# Patient Record
Sex: FEMALE | Employment: OTHER | URBAN - METROPOLITAN AREA
[De-identification: names, ages, dates, MRNs, and addresses within clinical notes are randomized per-mention and may not be internally consistent; named-entity substitution may affect disease eponyms.]

---

## 2022-10-11 ENCOUNTER — HOSPITAL ENCOUNTER (OUTPATIENT)
Dept: PHYSICAL THERAPY | Facility: CLINIC | Age: 72
Setting detail: THERAPIES SERIES
Discharge: HOME | End: 2022-10-11
Payer: MEDICARE

## 2022-10-11 PROCEDURE — 95992 CANALITH REPOSITIONING PROC: CPT

## 2022-10-11 PROCEDURE — 97161 PT EVAL LOW COMPLEX 20 MIN: CPT

## 2022-10-11 PROCEDURE — 97110 THERAPEUTIC EXERCISES: CPT

## 2022-10-11 NOTE — CONSULTS
[facility] &  Therapy  [address]  [phone]  F: [phone] [] [address] [facility] [address]   [address]  P: [phone]  F: [phone] [] [address] &  Therapy  [address] [facility]  P: [phone]  F: [phone] [x] [address]  P: [phone]  F: [phone] [] [address]  [facility]   [address]   [place]: [phone]  F: [phone]       Physical Therapy Vestibular Rehab Evaluation    Date:  10/11/2022  Patient: Thad Freeman  : 1950  MRN: [id number]  Physician: Josh Aguero   Insurance: Medicare   Medical Diagnosis: D94 (ICD-10-CM) - Vertigo  Rehab Codes: V55 (ICD-10-CM) - Vertigo; H81.10  Onset date: 22; see below  Next Dr's appt.: TBD- pending success with PT services     Subjective:   CC:    Reports h/o symptoms several years ago- sig less intense- LH, \"did not progress to vertigo\"- and resolved quickly. Reports relief when underwent Epley maneuver via MD in the past.   Attempted x2-3 home Epley maneuvers as of recent without relief- \"so bad- that if I tried to complete it- I would pass out. \"  Referring MD did not test or treat in clinic for concern of pt experiencing LH/fainting symptoms. \"If I get up and out of bed, roll over in bed- L > R.\"   No relief with utilizing Meclizine- not longer utilizing. HPI: 22; see above     Comorbidities: NONE  [] Obesity [] Dialysis  [] Other:   [] Asthma/COPD [] Dementia [] Other:   [] Stroke [] Sleep apnea [] Other:   [] Vascular disease [] Rheumatic disease [] Other:      PMHx:    [x] Unremarkable [] Diabetes  []MI/Heart Problems   [] Pacemaker  [] HTN   [] Cancer   [x] Arthritis:   [] Surgeries:   [] Other:    Test: [] X-Ray  [] MRI- nothing to cervical spine or brain thus far. Allergies: [x] NKA  [] Refer to intake sheet  Medication: [x] Refer to intake sheet  [] None         Function:     Hand dominance [x] (R)  [] (L)  Working:     [] Normal duty  [] Light duty  [] Off D/T Condition   [x] Retired  [] Not employed [] Disability   [] Other   Return to work:     Job/ADL Description: N/A    Assist Device: N/A    Pain:    /10  [x] No c/o pain    Pain altered Tx: [x] No  [] Yes Action:  Symptoms: [x] Improving  [] Worsening  [] Same  Sleep: [] OK  [x] Disturbed- SEE ABOVE        Function:  Hand Dominance  [x] Right  [] Left  Patient lives with: Someone/does not live alone    In what type of home []  One story   [x] Two story   [] Split level   Number of stairs to enter A few    With handrail on the [x]  Right to enter   [x] Left to enter     ADL/IADL Previous level of function Current level of function Who currently assists the patient with task   Bathing  [x] Independent  [] Assist [x] Independent  [] Assist    Dress/grooming [x] Independent  [] Assist [x] Independent  [] Assist    Transfer/mobility [x] Independent  [] Assist [x] Independent  [] Assist    Feeding [x] Independent  [] Assist [x] Independent  [] Assist    Toileting [x] Independent  [] Assist [x] Independent  [] Assist    Driving [x] Independent  [] Assist [x] Independent  [] Assist Reports has not yet attempted driving since symptoms began- however- could if she were asked to today.     Housekeeping [x] Independent  [] Assist [x] Independent  [] Assist    Grocery shop/meal prep [x] Independent  [] Assist [x] Independent  [] Assist      Gait Prior level of function Current level of function    [x] Independent  [] Assist [x] Independent  [] Assist   Device: [] Independent [] Independent    [] Straight Cane [] Quad cane [] Straight Cane [] Quad cane    [] Standard walker [] Rolling walker   [] 4 wheeled walker [] Standard walker [] Rolling walker   [] 4 wheeled walker    [] Wheelchair [] Wheelchair     Subjective Measure Score Indications   DHI [Dizziness Handicap Inventory] X; 22/100 TRACE IMPAIRMENT/DISABILITY    ABC [Activities Balance Confidence Scale] X; 72.5% TRACE IMPAIRMENT/DISABILITY    PCSS [Post Concussion Symptom Survey Scale] - -   BIVSS [Brain Injury Vision Symptom Survey]  - -     OBSERVATION No Deficit Deficit Not Tested   Posture      Forward Head []  [x]  []    Rounded Shoulders [] [x] []   Kyphosis [x] []inc.  [] dec. []   Lordosis [x] []inc.  [] dec. []   Lateral Shift [x] [] (R) [] (L) []   Scoliosis [x] [] []     Intermittent L thumb N/T d/t ganglion cyst.    Red flags & Special Tests:     Test Result   Vertebral Artery N   Alar Ligament N   Transverse Ligament N     Cervical spine AROM:    Motion Goniometric Measurement Pain/Symptoms   Flexion WNL NONE   Extension WNL NONE   R, L rotation WNL, symmetrical B NONE   R, L SB WNL, symmetrical B NONE     Describes as LH/imbalance. Body swaying. Cervical spine strength: Motion MMT Grade   Flexion WNL   Extension WNL   R, L rotation WNL   R, L SB WNL     Oculomotor Tests- With Fixation (Open Vision):    Dons neither glasses or contacts. Test Result- Normal, Abnormal (R) (L) Upward Downward Description   Spontaneous Nystagmus N        Gaze Holding Nystagmus N        Smooth Pursuit N        Ocular ROM N        Saccades N        Convergence    N/A N/A x   VOR Slow N N/A N/A N/A N/A    VOR Cancellation N N/A N/A N/A N/A    Head Thrust/Head Impulse Test  N   N/A N/A LH- \"at the end\"    Static Visual Acuity [20ft]  N/A N/A N/A N/A x   Dynamic Visual Acuity [20ft]  WNL    >3 Line difference    Muscle Guard N/A N/A N/A N/A x  Read lowest line possible, backwards    Valsalva N          Oculomotor Tests- Without Fixation (Vision Blocked):    Test Result Description   Spontaneous Nystagmus N    Gaze Hold Nystagmus N    Valsalva N    Head-Shaking Nystagmus N      Denies symptoms with all above tests and measures.      Positional Tests: Test R Torsional Upbeat L Torsional Upbeat Up Beat Down Beat Horizontal Duration Symptoms   Shruthi Hallpike- R - - - - - - -   [place] Hallpike- L - X - - - < 15\"                     < 30\"        < 30\"  Nystagmus and symptoms upon first trial- slight    Ludmila nystagmus     Ludmila nystagmus      Roll Test - - - - No nystagmus with L head turn, but ludmila with R head turn <30\" R > L intensity of symptoms      First trial of shruthi hallpike performed without IR goggles d/t pt hesitation/anxiety. Second and third trials, as well as positional treatments, performed with IR goggles. Positional Treatments:    BPPV Type Treatment Technique Trials Result Other:   L PC canalithiasis  L PC canalithiasis CRT x2 Negative for nystagmus and symptoms throughout both CRTs    Potential R horizontal canal involvement     Will re-test and treat if appropriate next visit                    Problems:                                                                                     [x]  1. Vertigo- intermittent   []  2. Difficulty walking a straight course                                    [x]  3. Positive [place] Hallpike                                               []  4.  Static balance deficit: mCTSIB  []  5. Dynamic balance deficit: Krishnamurthy Balance Scale (BBS), Dynamic Gait Index (DGI), Functional Gait Assessment (FGA)  [x]  6. Increased Dizziness Handicap Inventory ([address] [place] [address])   []  7. Increased Post Concussion Symptom Survey (PCSS)  []  8. Increased Brain Injury Vision Symptom Survey (BIVSS)      Short Term Goals: (     6        Treatments)  [x] 1. Decreased Vertigo- intermittent   [] 2. Improved gait mechanics, trajectory  [x] 3. Negative Shruthi Hallpike  [] 4. Improved static balance  [] 5. Improved dynamic balance  [x] 6. Decreased Dizziness Handicap Inventory ([address] [place] [address])- <22/100   [] 7. Decreased Post Concussion Symptom Survey (PCSS)  [] 8. Decreased Brain Injury Vision Symptom Survey (BIVSS)  [x] 9.  Independent with Home Exercise Program (HEP)  [] 10. Demonstrate knowledge of fall prevention  11. Will demo ABC > 72.5% confidence  12. Will deny symptoms with rolling in bed, supine<>sit, sit<>supine for improved QOL. Patient Goals: \"vertigo. \"     Assessment: Patient is a 81 y.o. pleasant female who presents to physical therapy initial evaluation with signs and symptoms consistent with potential L PC, R HC BPPV- L PC nearly resolved. Patient demonstrates impairments and symptoms as detailed below in therapy goals. Patient currently limited in sit<>supine, rolling in bed, supine<>sit secondary to these impairments and increased symptoms. Patient would benefit from continued physical therapy services in order to address these impairments and symptoms, and return to QOL, ADLs, work. Anticipated frequency detailed above. Prognosis limited secondary to h/o potential BPPV- relieved with Epley manuever through MD; current episode at greater intensity and horizontal canal involvement- justifying a low complexity evaluation. If unable to achieve significant improvements within six visits, will consult referring physician and consider a follow-up visit with said physician. Patient verbalized understanding and agreement to all aforementioned statements. Rehab Potential: [x] Good  [x] Fair  [] Poor  Suggested Professional Referral:  [x] No  [] Yes  Barriers to Goal Achievement: [] No  [x] Yes        If yes: h/o potential BPPV- relieved with Epley manuever through MD; current episode at greater intensity and horizontal canal involvement.    Domestic Concerns: [x] No  [] Yes     If yes:    Treatment Plan:  [x] Therapeutic Exercise   19464  [] Iontophoresis: 4 mg/mL Dexamethasone Sodium Phosphate  mAmin  81620   [x] Therapeutic Activity  14437 [] Vasopneumatic cold with compression  49862    [] Gait Training   04319 [] Ultrasound   28110   [x] Neuromuscular Re-education  41142 [] Electrical Stimulation Unattended  80241   [] Manual Therapy  24961 [] Electrical Stimulation Attended  [id number]   [x] Instruction in HEP  [] Lumbar/Cervical Traction  [id number]   [] Aquatic Therapy   [id number] [] Cold/hotpack    [] Massage   [id number]      [] Dry Needling, 1 or 2 muscles  85312   [] Biofeedback, first 15 minutes   92479  [] Biofeedback, additional 15 minutes   45707 [] Dry Needling, 3 or more muscles  27775     []  Medication allergies reviewed for use of    Dexamethasone Sodium Phosphate 4mg/ml     with iontophoresis treatments. Pt is not allergic. Other: X- CRT- [id number]. Frequency: 1 X/wk  x 6 visits      [x] Plans/Goals, Risk/Benefits discussed with pt/family      Pt/Family Education: [x] Verbal  [x] Demo  [x] Written    Comprehension of Education:  [x] Verbalizes understanding. [] Demonstrates understanding. [x] Needs Review. [] Demonstrates/verbalizes understanding of HEP/Ed previously given. Todays Treatment:  Modalities:   Precautions: potential L PC, R HC BPPV- L PC nearly resolved   Exercises:  Exercise Reps/ Time Weight/ Level Comments   Education- extensive      Vestibular evaluation- objective tests, measures- rationale, expectations, results; potential resolving L PC, but concurrent R HC BPPV- issued informational handouts; performed x2 L PC canalithiasis CRT- issued post-CRT positioning precautions for x48-72 hours only; will re-assess and treat HC if appropriate next visit- verbalized understanding to all                                            Other:     Specific Instructions for next treatment: Re-assess and treat for R HC BPPV- if appropriate- next visit.      Evaluation Complexity:  History (Personal factors, comorbidities) [] 0 [x] 1-2 [] 3+   Exam (limitations, restrictions) [] 1-2 [x] 3 [] 4+   Clinical presentation (progression) [x] Stable [] Evolving  [] Unstable   Decision Making [x] Low [] Moderate [] High     [x] Low Complexity [] Moderate Complexity [] High Complexity     Today's Treatment Charges        Mins       Units   [x] PT Evaluation- [x] Low; [] Moderate; [] High  41 1   [x] Canalith repositioning maneuver/technique (CRM/T) 10 1   [x] Therapeutic Exercise 15min 10 1     TOTAL TREATMENT TIME: 61    Medicare- $159.31    Start Time: 8:04AM             Stop Time: 9:05AM     More objective information is available upon request.  Thank you for this referral.    Electronically signed by: Gena Medrano PT, DPT    Physician Signature:________________________________Date:__________________  By signing above or cosigning this note, I have reviewed this plan of care and certify a need for medically necessary rehabilitation services.       *PLEASE SIGN ABOVE AND FAX BACK ALL PAGES*

## 2022-10-11 NOTE — FLOWSHEET NOTE
[facility] Fall Risk Assessment    Patient Name:  Sudha Olivas  : 1950    Risk Factor Scale  Score   History of Falls [] Yes  [x] No 25  0 0   Secondary Diagnosis [] Yes  [x] No 15  0 0   Ambulatory Aid [] Furniture  [] Crutches/cane/walker  [x] None/bedrest/wheelchair/nurse 30  15  0 0   IV/Heparin Lock [] Yes  [x] No 20  0 0   Gait/Transferring [] Impaired  [x] Weak  [] Normal/bedrest/immobile 20  10  0 10   Mental Status [] Forgets limitations  [x] Oriented to own ability 15  0 0      Total: 10     Based on the Assessment score: check the appropriate box.     [x]  No intervention needed   Low =   Score of 0-24    []  Use standard prevention interventions Moderate =  Score of 24-44   [] Give patient handout and discuss fall prevention strategies   [] Establish goal of education for patient/family RE: fall prevention strategies    []  Use high risk prevention interventions High = Score of 45 and higher   [] Give patient handout and discuss fall prevention strategies   [] Establish goal of education for patient/family Re: fall prevention strategies   [] Discuss lifeline / other resources    Electronically signed by:   Cuate Keller PT  Date: 10/11/2022

## 2022-10-18 ENCOUNTER — HOSPITAL ENCOUNTER (OUTPATIENT)
Dept: PHYSICAL THERAPY | Facility: CLINIC | Age: 72
Setting detail: THERAPIES SERIES
Discharge: HOME | End: 2022-10-18
Payer: MEDICARE

## 2022-10-18 PROCEDURE — 97530 THERAPEUTIC ACTIVITIES: CPT

## 2022-10-18 PROCEDURE — 97110 THERAPEUTIC EXERCISES: CPT

## 2022-10-18 NOTE — FLOWSHEET NOTE
[] [facility] [address]  [address]  [phone]  F: [phone] [] [address]  [address]   [address]  P: [phone]  F: [phone] [] [address]  Therapy  [address] [facility]  P: [phone]  F: [phone] [x] [address]  P: [phone]  F: [phone] [] [address]  [facility]   [address]   [place]: [phone]  F: [phone]      Physical Therapy Daily Treatment Note    Date:  10/18/2022  Patient Name:  Lor Quiñones    :  1950  MRN: [id number]  Physician: Jr Coles                         Insurance: Medicare   Medical Diagnosis: E02 (ICD-10-CM) - Vertigo                   Rehab Codes: H57 (ICD-10-CM) - Vertigo; H81.10  Onset date: 22; see below                   Next Dr's appt.: TBD- pending success with PT services   Visit# / total visits: ; Progress note for Medicare patient due at visit 6 [or end of PT POC]      Cancels/No Shows: 0/0    Subjective:    Pain:  [] Yes  [x] No Location: dizziness [NOT AT PRESENT] Pain Rating: (0-10 scale) 0/10  Pain altered Tx:  [x] No  [] Yes  Action: N/A  Comments: Reports sig relief post last visit- which included a CRT. Good compliance, tolerance to post-CRT positioning precautions for x48 hours only. Reports sleeping in normal positions as well- like upon her L side- and did not experience symptoms. Denies specific episodes of dizziness since last visit- only experienced slight imbalance. Agreeable to place PT services on hold for x1 month.      Todays Treatment:  Modalities:   Precautions: potential L PC, R HC BPPV- L PC nearly resolved   Exercises:  Exercise Reps/ Time Weight/ Level Comments   Goal update, DHI, HEP/PT POC education    COMPLETED- 10/18/22  Instructed to resume normal sleeping positions and functional mobility; not appropriate for treatment today; PT services will be placed on hold for x1 month- may return if symptoms resume or change- otherwise will be discharged if does not return in that time; do not attempt home epley maneuvers if symptoms return- verbalized understanding to all    Education- extensive      Vestibular evaluation- objective tests, measures- rationale, expectations, results; potential resolving L PC, but concurrent R HC BPPV- issued informational handouts; performed x2 L PC canalithiasis CRT- issued post-CRT positioning precautions for x48-72 hours only; will re-assess and treat HC if appropriate next visit- verbalized understanding to all    BPPV re-check- HC assessment/roll test      COMPLETED- 10/18/22                                 Other:    Positional Tests:    Test R Torsional Upbeat L Torsional Upbeat Up Beat Down Beat Horizontal Duration Symptoms   Roll Test - - - - - - -     Negative for nystagmus and symptoms with roll test both directions. Positional Treatments: NOT APPROPRIATE FOR TODAY     BPPV Type Treatment Technique Trials Result Other:                                 Specific Instructions for next treatment: PT services placed on hold for x1 month- will be discharged if does not return in that time. Treatment Charges: Mins Units   []  Modalities     [x]  Ther Exercise 10 1   []  Manual Therapy     [x]  Ther Activities 13 1   []  Aquatics     []  Vasocompression     []  Other     Total Treatment time 23 2     Medicare- $217.23    Assessment: [x] Progressing toward goals. Pt presents without symptoms, and reports good tolerance/relief post last visit- which included a CRT. Good compliance, tolerance to post-CRT positioning precautions for x48 hours only. Reports sleeping in normal positions as well- like upon her L side- and did not experience symptoms.  Denies specific episodes of dizziness since last visit- only experienced slight imbalance. Therefore, began session with re-assessment for [facility]. involvement secondary to mixed nystagmus present at initial evaluation- negative for nystagmus and symptoms- not appropriate for treatment. Proceeded with goal update and re-assessment of [facility]- results as detailed above and below- meets all therapy goals. Pt educated as follows: instructed to resume normal sleeping positions and functional mobility; not appropriate for treatment today; PT services will be placed on hold for x1 month- may return if symptoms resume or change- otherwise will be discharged if does not return in that time; do not attempt home epley maneuvers if symptoms return- verbalized understanding to all. [] No change. [] Other:  [x] Patient would continue to benefit from skilled physical therapy services in order to: dec symptoms with rolling in bed, supine<>sit, and sit<>supine. Problems:                                                                                     [x]  1. Vertigo- intermittent   []  2. Difficulty walking a straight course                                    [x]  3. Positive [place] Hallpike                                               []  4.  Static balance deficit: mCTSIB  []  5. Dynamic balance deficit: Krishnamurthy Balance Scale (BBS), Dynamic Gait Index (DGI), Functional Gait Assessment (FGA)  [x]  6. Increased Dizziness Handicap Inventory ([facility])   []  7. Increased Post Concussion Symptom Survey (PCSS)  []  8. Increased Brain Injury Vision Symptom Survey (BIVSS)        Short Term Goals: (     6        Treatments)  [x] 1. Decreased Vertigo- intermittent- MET  [] 2. Improved gait mechanics, trajectory  [x] 3. Negative Shruthi Hallpike- MET  [] 4. Improved static balance  [] 5. Improved dynamic balance  [x] 6. Decreased Dizziness Handicap Inventory ([facility])- <22/100- MET- 0/100  [] 7. Decreased Post Concussion Symptom Survey (PCSS)  [] 8.  Decreased Brain Injury Vision Symptom Survey (BIVSS)  [x] 9. Independent with Home Exercise Program (HEP)- MET  [] 10. Demonstrate knowledge of fall prevention  11. Will demo ABC > 72.5% confidence- MET- 100% confidence in preventing a fall   12. Will deny symptoms with rolling in bed, supine<>sit, sit<>supine for improved QOL.- MET      Patient Goals: \"vertigo. \"- MET    Pt. Education:  [x] Yes  [] No  [] Reviewed Prior HEP/Ed  Method of Education: [x] Verbal  [] Demo  [] Written  Comprehension of Education:  [x] Verbalizes understanding. [] Demonstrates understanding. [x] Needs review. [] Demonstrates/verbalizes HEP/Ed previously given. 10/18/22- See grid above for details. Plan: [x] Continue current frequency toward long and short term goals. [x] Specific Instructions for subsequent treatments: PT services placed on hold for x1 month- will be discharged if does not return in that time.        Time In: 8:02AM            Time Out: 8:25AM    Electronically signed by:  Shantel Lee PT

## 2022-11-04 ENCOUNTER — HOSPITAL ENCOUNTER (OUTPATIENT)
Dept: PHYSICAL THERAPY | Facility: CLINIC | Age: 72
Setting detail: THERAPIES SERIES
Discharge: HOME | End: 2022-11-04
Payer: MEDICARE

## 2022-11-04 PROCEDURE — 95992 CANALITH REPOSITIONING PROC: CPT

## 2022-11-04 PROCEDURE — 97530 THERAPEUTIC ACTIVITIES: CPT

## 2022-11-04 PROCEDURE — 97110 THERAPEUTIC EXERCISES: CPT

## 2022-11-04 NOTE — FLOWSHEET NOTE
[] [facility]  [address]  [phone]  F: [phone] [] [address]  [facility]   [address]  P: [phone]  F: [phone] [] [place] [address]  Therapy  [address]  P: [phone]  F: [phone] [x] [address]  P: [phone]  F: [phone] [] [address]  [facility]   [address]   [place]: [phone]  F: [phone]      Physical Therapy Daily Treatment Note    Date:  2022  Patient Name:  Janette Zacarias    :  1950  MRN: [id number]  Physician: Adriana Castanon                         Insurance: Medicare   Medical Diagnosis: C16 (ICD-10-CM) - Vertigo                   Rehab Codes: E85 (ICD-10-CM) - Vertigo; H81.10  Onset date: 22; see below                   Next Dr's appt.: TBD- pending success with PT services   Visit# / total visits: 3/6; Progress note for Medicare patient due at visit 6 [or end of PT POC]      Cancels/No Shows: 0/0    Subjective:    Pain:  [] Yes  [x] No Location: dizziness [NOT AT PRESENT] Pain Rating: (0-10 scale) 0/10  Pain altered Tx:  [x] No  [] Yes  Action: N/A  Comments:     \"Rolled over- felt dizzy- then in bed and getting up- dizzy and kind of dark- but not as bad as it was before. \"   Dec symptoms this AM, however, feels \"wobbly. \"   Denies nausea, vomiting with this episode. \"Just know something is there- one is not normal.\"   Continues to report L > R.      Todays Treatment:  Modalities:   Precautions: potential L PC, R HC BPPV- L PC nearly resolved   Exercises:  Exercise Reps/ Time Weight/ Level Comments   Goal update, DHI, HEP/PT POC education    COMPLETED- 10/18/22  Instructed to resume normal sleeping positions and functional mobility; not appropriate for treatment today; PT services will be placed on hold for x1 month- may return if symptoms resume or change- otherwise will be discharged if does not return in that time; do not attempt home epley maneuvers if symptoms return- verbalized understanding to all    Education- extensive      Vestibular evaluation- objective tests, measures- rationale, expectations, results; potential resolving L PC, but concurrent R HC BPPV- issued informational handouts; performed x2 L PC canalithiasis CRT- issued post-CRT positioning precautions for x48-72 hours only; will re-assess and treat HC if appropriate next visit- verbalized understanding to all- re-instated until next treatment appointment on 11/7/22   BPPV re-check     COMPLETED- 11/4/22   Semont maneuver  x3   COMPLETED- 11/4/22                       Other:    Positional Tests:    Test R Torsional Upbeat L Torsional Upbeat Up Beat Down Beat Horizontal Duration Symptoms   [place] Halpike- R - - - - - - -   Shruthi Halpike- L - X - - - <15\" Nystagmus and dizziness/spinning- but less intense compared with initial evaluation      Negative for nystagmus and symptoms with roll test B as well. Positional Treatments:    BPPV Type Treatment Technique Trials Result Other:   L PC canalithiasis  Semont maneuver     Head turn 45 deg toward R, quickly lay L, 2 minutes, then quickly lay R, 2 minutes x3 Reversal of nystagmus with laying upon R side during trials 1/2, but not third  Dec intensity and duration of nystagmus with each passing trial                          Specific Instructions for next treatment: Re-assess for L PC BPPV and treat if appropriate next visit- may consider [place] maneuver. Treatment Charges: Mins Units   []  Modalities     [x]  Ther Exercise 20 1   []  Manual Therapy     [x]  Ther Activities 14 1   []  Aquatics     []  Vasocompression     [x]  Other- CRT 20 1   Total Treatment time 54 3     Medicare- $305.20    Assessment: [] Progressing toward goals.      [] No change. [x] Other: Pt presents with imbalance, and reports return of dizziness/spinning two nights ago. However, symptoms less intense and dec duration as compared with the past. Therefore, re-assessed for BPPV this date- demos L torsional upbeat nystagmus only- no HC involvement. Proceeded with Semont maneuver x3- able to achieve reversal of nystagmus on first x2 trials. Re-instated post-CRT positioning precautions until next treatment appointment on 11/7/22. Will follow-up regarding tolerance and consider ideas listed above and below next visit. [x] Patient would continue to benefit from skilled physical therapy services in order to: dec symptoms with rolling in bed, supine<>sit, and sit<>supine. Problems:                                                                                     [x]  1. Vertigo- intermittent   []  2. Difficulty walking a straight course                                    [x]  3. Positive Shruthi Hallpike                                               []  4.  Static balance deficit: mCTSIB  []  5. Dynamic balance deficit: Krishnamurthy Balance Scale (BBS), Dynamic Gait Index (DGI), Functional Gait Assessment (FGA)  [x]  6. Increased Dizziness Handicap Inventory ([address] [place] [address])   []  7. Increased Post Concussion Symptom Survey (PCSS)  []  8. Increased Brain Injury Vision Symptom Survey (BIVSS)        Short Term Goals: (     6        Treatments)  [x] 1. Decreased Vertigo- intermittent- MET  [] 2. Improved gait mechanics, trajectory  [x] 3. Negative Shruthi Hallpike- MET  [] 4. Improved static balance  [] 5. Improved dynamic balance  [x] 6. Decreased Dizziness Handicap Inventory ([address] [place] [address])- <22/100- MET- 0/100  [] 7. Decreased Post Concussion Symptom Survey (PCSS)  [] 8. Decreased Brain Injury Vision Symptom Survey (BIVSS)  [x] 9. Independent with Home Exercise Program (HEP)- MET  [] 10. Demonstrate knowledge of fall prevention  11.  Will demo ABC > 72.5% confidence- MET- 100% confidence in preventing a fall 12. Will deny symptoms with rolling in bed, supine<>sit, sit<>supine for improved QOL.- MET      Patient Goals: \"vertigo. \"- MET    Pt. Education:  [x] Yes  [] No  [] Reviewed Prior HEP/Ed  Method of Education: [x] Verbal  [] Demo  [] Written  Comprehension of Education:  [x] Verbalizes understanding. [] Demonstrates understanding. [] Needs review. [] Demonstrates/verbalizes HEP/Ed previously given. 10/18/22- See grid above for details. 11/4/22- See grid above for details. Plan: [x] Continue current frequency toward long and short term goals. [x] Specific Instructions for subsequent treatments: Re-assess for L PC BPPV and treat if appropriate next visit- may consider Ismael maneuver.        Time In: 8:02AM            Time Out: 8:56AM    Electronically signed by:  Corrie Lesch, PT

## 2022-11-07 ENCOUNTER — HOSPITAL ENCOUNTER (OUTPATIENT)
Dept: PHYSICAL THERAPY | Facility: CLINIC | Age: 72
Setting detail: THERAPIES SERIES
Discharge: HOME | End: 2022-11-07
Payer: MEDICARE

## 2022-11-07 PROCEDURE — 95992 CANALITH REPOSITIONING PROC: CPT

## 2022-11-07 PROCEDURE — 97110 THERAPEUTIC EXERCISES: CPT

## 2022-11-07 PROCEDURE — 97530 THERAPEUTIC ACTIVITIES: CPT

## 2022-11-07 NOTE — FLOWSHEET NOTE
[] [facility]  [address]  [phone]  F: [phone] [] [address]  [place] [address]   [address]  P: [phone]  F: [phone] [] [place]  Therapy  [address]  P: [phone]  F: [phone] [x] [address]  P: [phone]  F: [phone] [] [address]  [facility]   [address]   [place]: [phone]  F: [phone]      Physical Therapy Daily Treatment Note    Date:  2022  Patient Name:  Janette Zacarias    :  1950  MRN: [id number]  Physician: Adriana Castanon                         Insurance: Medicare   Medical Diagnosis: E27 (ICD-10-CM) - Vertigo                   Rehab Codes: L39 (ICD-10-CM) - Vertigo; H81.10  Onset date: 22; see below                   Next Dr's appt.: TBD- pending success with PT services   Visit# / total visits: ; Progress note for Medicare patient due at visit 6 [or end of PT POC]      Cancels/No Shows: 0/0    Subjective:    Pain:  [] Yes  [x] No Location: dizziness [NOT AT PRESENT] Pain Rating: (0-10 scale) 0/10  Pain altered Tx:  [x] No  [] Yes  Action: N/A  Comments:     No relief post last visit- which included x3 CRTs. \"Out of sorts. \"   Denies sig symptoms with bed mobility over the weekend. However, good compliance, tolerance to post-CRT positioning precautions- did not sleep upon her L side. \"Sloshing in my head. \"   Difficulty with reading, utilizing her phone, and driving as of recent. Recently underwent flu vaccine just prior to the start of this second flare-up in her symptoms.      Todays Treatment:  Modalities:   Precautions: potential L PC, R HC BPPV- L PC nearly resolved   Exercises:  Exercise Reps/ Time Weight/ Level Comments   Goal update, DHI, HEP/PT POC education    COMPLETED- 10/18/22  Instructed to resume normal sleeping positions and functional mobility; not appropriate for treatment today; PT services will be placed on hold for x1 month- may return if symptoms resume or change- otherwise will be discharged if does not return in that time; do not attempt home epley maneuvers if symptoms return- verbalized understanding to all    Education- extensive      Vestibular evaluation- objective tests, measures- rationale, expectations, results; potential resolving L PC, but concurrent R HC BPPV- issued informational handouts; performed x2 L PC canalithiasis CRT- issued post-CRT positioning precautions for x48-72 hours only; will re-assess and treat HC if appropriate next visit- verbalized understanding to all- re-instated for at least x24 hours- 11/7/22   BPPV re-check     COMPLETED- 11/7/22   L PC canalithiasis CRT x2  COMPLETED- 11/7/22   Semont maneuver  x3   COMPLETED- 11/4/22             Static VS dynamic visual acuity    COMPLETED- 11/7/22    Trial of x1 viewing- seated, plain, large letter B, MOD speed x30\" ea  x1' ea [address]- 11/7/22  Progressed to performing in standing  Reports only slight symptoms with HORZ             Other:    Static visual acuity: reads full eye chart without errors- no glasses  Dynamic visual acuity: symptoms began at line 5, able to continue to line 9 before ceases test- reproduces symptoms similar to those experienced at home. Specific Instructions for next treatment: Anticipate placing PT services on hold next visit- re-assess [address] and issue avila-kika for potential use while on vacation. Re-assess for L PC BPPV and treat if appropriate next visit- may consider [place] maneuver.      Treatment Charges: Mins Units   []  Modalities     [x]  Ther Exercise 8 1   []  Manual Therapy     [x]  Ther Activities 20 1   []  Aquatics     []  Vasocompression     [x]  Other- CRT 15 1   Total Treatment time 0/100  [] 7. Decreased Post Concussion Symptom Survey (PCSS)  [] 8. Decreased Brain Injury Vision Symptom Survey (BIVSS)  [x] 9. Independent with Home Exercise Program (HEP)- MET  [] 10. Demonstrate knowledge of fall prevention  11. Will demo ABC > 72.5% confidence- MET- 100% confidence in preventing a fall   12. Will deny symptoms with rolling in bed, supine<>sit, sit<>supine for improved QOL.- MET      Patient Goals: \"vertigo. \"- MET    Pt. Education:  [x] Yes  [] No  [] Reviewed Prior HEP/Ed  Method of Education: [x] Verbal  [] Demo  [] Written  Comprehension of Education:  [x] Verbalizes understanding. [] Demonstrates understanding. [] Needs review. [] Demonstrates/verbalizes HEP/Ed previously given. 10/18/22- See grid above for details. 11/4/22- See grid above for details. 11/7/22- See grid above for details. Plan: [x] Continue current frequency toward long and short term goals. [x] Specific Instructions for subsequent treatments: Anticipate placing PT services on hold next visit- re-assess [address] and issue avila-daroff for potential use while on vacation. Re-assess for L PC BPPV and treat if appropriate next visit- may consider [place] maneuver.        Time In: 10:02AM        Time Out: 10:45AM    Electronically signed by:  Shirlene Calderon PT

## 2022-11-11 ENCOUNTER — HOSPITAL ENCOUNTER (OUTPATIENT)
Dept: PHYSICAL THERAPY | Facility: CLINIC | Age: 72
Setting detail: THERAPIES SERIES
Discharge: HOME | End: 2022-11-11
Payer: MEDICARE

## 2022-11-11 PROCEDURE — 97530 THERAPEUTIC ACTIVITIES: CPT

## 2022-11-11 PROCEDURE — 97110 THERAPEUTIC EXERCISES: CPT

## 2022-11-11 NOTE — FLOWSHEET NOTE
[] [facility].  [address]  [phone]  F: [phone] [] [address]  [facility]   [address]  P: [phone]  F: [phone] [] [facility]  Therapy  [address]  P: [phone]  F: [phone] [x] [address]  P: [phone]  F: [phone] [] [address]  [facility]   [address]   [place]: [phone]  F: [phone]      Physical Therapy Daily Treatment Note    Date:  2022  Patient Name:  Fabi Hoffman    :  1950  MRN: [id number]  Physician: Sparkle Nobles                         Insurance: Medicare   Medical Diagnosis: L30 (ICD-10-CM) - Vertigo                   Rehab Codes: X07 (ICD-10-CM) - Vertigo; H81.10  Onset date: 22; see below                   Next Dr's appt.: TBD- pending success with PT services   Visit# / total visits: ; Progress note for Medicare patient due at visit 6 [or end of PT POC]      Cancels/No Shows: 0/0    Subjective:    Pain:  [] Yes  [x] No Location: dizziness [NOT AT PRESENT] Pain Rating: (0-10 scale) 0/10  Pain altered Tx:  [x] No  [] Yes  Action: N/A  Comments:     Sig relief post last visit. Plans to travel out of town for - agreeable to place PT services on hold for x1 month.      Todays Treatment:  Modalities:   Precautions: potential L PC, R HC BPPV- L PC nearly resolved   Exercises:  Exercise Reps/ Time Weight/ Level Comments   Goal update, DHI, ABC, HEP/PT POC education- including adrianna and consent for release of therapy notes     COMPLETED- 22  May resume normal sleeping positions and functional mobility- d/c post-CRT positioning precautions; issued avila-brinaoff in case symptoms return while on vacation; chart will remain open for x1 month- otherwise will be discharged- verbalized understanding to all    Goal update, DHI, HEP/PT POC education    COMPLETED- 10/18/22  Instructed to resume normal sleeping positions and functional mobility; not appropriate for treatment today; PT services will be placed on hold for x1 month- may return if symptoms resume or change- otherwise will be discharged if does not return in that time; do not attempt home epley maneuvers if symptoms return- verbalized understanding to all    Education- extensive      Vestibular evaluation- objective tests, measures- rationale, expectations, results; potential resolving L PC, but concurrent R HC BPPV- issued informational handouts; performed x2 L PC canalithiasis CRT- issued post-CRT positioning precautions for x48-72 hours only; will re-assess and treat HC if appropriate next visit- verbalized understanding to all- re-instated for at least x24 hours- 11/7/22   BPPV re-check     COMPLETED- 11/7/22   L PC canalithiasis CRT x2  COMPLETED- 11/7/22   Semont maneuver  x3   COMPLETED- 11/4/22             Static VS dynamic visual acuity    COMPLETED- 11/7/22    Trial of x1 viewing- seated, plain, large letter B, MOD speed x30\" ea  x1' ea [address]- 11/7/22  Progressed to performing in standing  Reports only slight symptoms with HORZ             Other:    See below for goal update. DHI is 0/100  ABC is 100%    Specific Instructions for next treatment: PT services placed on hold for x1 month- otherwise will be discharged. Re-assess for L PC BPPV and treat if appropriate next visit- may consider [place] maneuver. Treatment Charges: Mins Units   []  Modalities     [x]  Ther Exercise 10 1   []  Manual Therapy     [x]  Ther Activities 10 1   []  Aquatics     []  Vasocompression     []  Other- CRT     Total Treatment time 20 2     Medicare- $451.09    Assessment: [x] Progressing toward goals.  Pt presents without symptoms, and reports sig relief post last visit. Therefore, performed goal update and re-assessed DHI, ABC for potential discharge note to referring MD- results as detailed above and below- meets all therapy goals. Pt instructed as follows: may resume normal sleeping positions and functional mobility- d/c post-CRT positioning precautions; issued avila-daroff in case symptoms return while on vacation; chart will remain open for x1 month- otherwise will be discharged- verbalized understanding to all. [] No change. [] Other:  [x] Patient would continue to benefit from skilled physical therapy services in order to: dec symptoms with rolling in bed, supine<>sit, and sit<>supine. Problems:                                                                                     [x]  1. Vertigo- intermittent   []  2. Difficulty walking a straight course                                    [x]  3. Positive [place] Hallpike                                               []  4.  Static balance deficit: mCTSIB  []  5. Dynamic balance deficit: Krishnamurthy Balance Scale (BBS), Dynamic Gait Index (DGI), Functional Gait Assessment (FGA)  [x]  6. Increased Dizziness Handicap Inventory ([address] [place] [address])   []  7. Increased Post Concussion Symptom Survey (PCSS)  []  8. Increased Brain Injury Vision Symptom Survey (BIVSS)        Short Term Goals: (     6        Treatments)  [x] 1. Decreased Vertigo- intermittent- MET  [] 2. Improved gait mechanics, trajectory  [x] 3. Negative [place] Hallpike- MET  [] 4. Improved static balance  [] 5. Improved dynamic balance  [x] 6. Decreased Dizziness Handicap Inventory ([address] [place] [address])- <22/100- MET- 0/100  [] 7. Decreased Post Concussion Symptom Survey (PCSS)  [] 8. Decreased Brain Injury Vision Symptom Survey (BIVSS)  [x] 9. Independent with Home Exercise Program (HEP)- MET  [] 10. Demonstrate knowledge of fall prevention  11. Will demo ABC > 72.5% confidence- MET- 100% confidence in preventing a fall   12.  Will deny symptoms with rolling in bed, supine<>sit, sit<>supine for improved QOL.- MET      Patient Goals: \"vertigo. \"- MET    Pt. Education:  [x] Yes  [] No  [] Reviewed Prior HEP/Ed  Method of Education: [x] Verbal  [x] Demo  [x] Written  Comprehension of Education:  [x] Verbalizes understanding. [] Demonstrates understanding. [] Needs review. [] Demonstrates/verbalizes HEP/Ed previously given. 10/18/22- See grid above for details. 11/4/22- See grid above for details. 11/7/22- See grid above for details. 11/11/22- See grid above for details. Plan: [x] Continue current frequency toward long and short term goals. [x] Specific Instructions for subsequent treatments: PT services placed on hold for x1 month- otherwise will be discharged. Re-assess for L PC BPPV and treat if appropriate next visit- may consider Ismael maneuver.        Time In: 8AM      Time Out: 8:20AM    Electronically signed by:  Nishi Hein, PT

## 2023-05-08 ENCOUNTER — HOSPITAL ENCOUNTER (OUTPATIENT)
Dept: PHYSICAL THERAPY | Facility: CLINIC | Age: 73
Setting detail: THERAPIES SERIES
Discharge: HOME | End: 2023-05-08
Payer: MEDICARE

## 2023-05-08 PROCEDURE — 97161 PT EVAL LOW COMPLEX 20 MIN: CPT

## 2023-05-08 PROCEDURE — 97140 MANUAL THERAPY 1/> REGIONS: CPT

## 2023-05-08 NOTE — FLOWSHEET NOTE
[facility] Fall Risk Assessment    Patient Name:  Pancho Mora  : 1950    Risk Factor Scale  Score   History of Falls [] Yes  [] No 25  0 0   Secondary Diagnosis [] Yes  [] No 15  0 0   Ambulatory Aid [] Furniture  [] Crutches/cane/walker  [] None/bedrest/wheelchair/nurse 30  15  0 0   IV/Heparin Lock [] Yes  [] No 20  0 0   Gait/Transferring [] Impaired  [] Weak  [] Normal/bedrest/immobile 20  10  0 0   Mental Status [] Forgets limitations  [] Oriented to own ability 15  0 0      Total:0     Based on the Assessment score: check the appropriate box.     [x]  No intervention needed   Low =   Score of 0-24    []  Use standard prevention interventions Moderate =  Score of 24-44   [] Give patient handout and discuss fall prevention strategies   [] Establish goal of education for patient/family RE: fall prevention strategies    []  Use high risk prevention interventions High = Score of 45 and higher   [] Give patient handout and discuss fall prevention strategies   [] Establish goal of education for patient/family Re: fall prevention strategies   [] Discuss lifeline / other resources    Electronically signed by:   Manju De La Vega PT  Date: 2023

## 2023-05-08 NOTE — CARE COORDINATION
Medicare Cap   [x] Physical Therapy  [] Speech Therapy  [] Occupational therapy  *PT and Speech caps combine      $2230 Limit for PT and Speech combined  $2230 Limit for OT individually  At the beginning of the month where you expect to go over $2150, please add the [address] [place] [address] modifier      Patient Name: Octaviano Calderon  YOB: 1950    Note:  This is an estimate of charges billed.      Date of [place] [address] Name # units/ charge $$$ charge Daily Total Charge Ongoing Total $$$   5/8/23 Alexis 1,1  97.03+20.83 117.86

## 2023-05-08 NOTE — CONSULTS
occipital tenderness. Patient goals:\"pain free\"    Rehab Potential:  [x] Good  [] Fair  [] Poor   Suggested Professional Referral:  [x] No  [] Yes:  Barriers to Goal Achievement:  [x] No  [] Yes:  Domestic Concerns:  [x] No  [] Yes:    Pt. Education:  [x] Plans/Goals, Risks/Benefits discussed  [x] Home exercise program  Method of Education: [] Verbal  [] Demo  [x] Written  Comprehension of Education:  [] Verbalizes understanding. [] Demonstrates understanding. [x] Needs Review. [] Demonstrates/verbalizes understanding of HEP/Ed previously given.     Treatment Plan:  [x] Therapeutic Exercise   60268  [] Iontophoresis: 4 mg/mL Dexamethasone Sodium Phosphate  mAmin  94542   [x] Therapeutic Activity  38940 [] Vasopneumatic cold with compression  41418    [] Gait Training   43842 [] Ultrasound   22436   [x] Neuromuscular Re-education  29357 [] Electrical Stimulation Unattended  17223   [x] Manual Therapy  54381 [] Electrical Stimulation Attended  42551   [x] Instruction in HEP  [] Lumbar/Cervical Traction  27628   [] Aquatic Therapy   96671 [x] /hotpack         Frequency: 2 x/week for 12 visits    Todays Treatment:  Modalities:   Treatment Location  Left      Right                          Position    []          []  [x] Supine    [] Prone   [] Side lying  [] Sitting          Treatment Modality   2 Hot pack to C spine    10 min   1 Other:  man       Precautions: standard  Exercises:  Exercise Reps/ Time Weight/ Level Comments                                 Other:  Manual  Sub occipital release/gentle C tx, temporalis release, pteragoid DI  Specific Instructions for next treatment:   Add RTC ex, gentle pec and bicep stretches, MT and LT ex  Issue NDI      Evaluation Complexity:  History (Personal factors, comorbidities) [x] 0 [] 1-2 [] 3+   Exam (limitations, restrictions) [x] 1-2 [] 3 [] 4+   Clinical presentation (progression) [x] Stable [] Evolving  [] Unstable   Decision Making [x] Low []

## 2023-05-19 ENCOUNTER — HOSPITAL ENCOUNTER (OUTPATIENT)
Dept: PHYSICAL THERAPY | Facility: CLINIC | Age: 73
Setting detail: THERAPIES SERIES
Discharge: HOME | End: 2023-05-19
Payer: MEDICARE

## 2023-05-19 PROCEDURE — 97110 THERAPEUTIC EXERCISES: CPT

## 2023-05-19 PROCEDURE — 97140 MANUAL THERAPY 1/> REGIONS: CPT

## 2023-05-19 NOTE — FLOWSHEET NOTE
[] [facility]  [address]  [phone]  F: [phone] [] [address]  [place] [address]   [address]  P: [phone]  F: [phone] [x] [place] [facility]  Therapy  [address]  P: [phone]  F: [phone] [] [address]  P: [phone]  F: [phone] [] [address]  [facility]   [address]   [place]: [phone]  F: [phone]      Physical Therapy Daily Treatment Note    Date:  2023  Patient Name:  Christoph Naranjo     :  1950  MRN: [id number]  ician: Melissa Gibson PA-C                  Insurance: medicare  Medical Diagnosis: L shoulder tendinitis    Rehab Codes: M25.552, M25.652  Onset Date: 23                 Next 's appt:  Visit# / total visits: ; Progress note for Medicare patient due at visit 10     Cancels/No Shows: 0/0    Subjective:    Pain:  [x] Yes  [] No Location: L shoulder Pain Rating: (0-10 scale) 7/10 neck; L shoulder 4/10 (lev scap)  Pain altered Tx:  [x] No  [] Yes  Action:  Comments: after the init eval, cracking is not much. Pain was much better after initial eval as well. Objective:   Todays Treatment:  Modalities:   Treatment Location  Left      Right                          Position     []          []  [x] Supine    [] Prone   [] Side lying  [] Sitting                                            Treatment Modality   PRN Hot pack to C spine    10 min   1,2 Other:  man,ex         Precautions: standard  Exercises:  Exercise Reps/ Time Weight/ Level Completed Comments    Prone          I-T 15x ea 1 lbs x Y is painful   ER at 90/90 15  1 lbs x    Sidelying       Ext rot 2x10 1 lbs x    Gym       Corner stretch 3x30\"  x    Scaption 20 1lb x    TB ER  2x10 lime x

## 2023-05-19 NOTE — CARE COORDINATION
Medicare Cap   [x] Physical Therapy  [] Speech Therapy  [] Occupational therapy  *PT and Speech caps combine      $2230 Limit for PT and Speech combined  $2230 Limit for OT individually  At the beginning of the month where you expect to go over $2150, please add the [address] [place] [address] modifier      Patient Name: Evan Villanueva  YOB: 1950    Note:  This is an estimate of charges billed.      Date of [address] Name # units/ charge $$$ charge Daily Total Charge Ongoing Total $$$   5/8/23 Alexis 1,1  97.03+20.83 117.86   5/19 gemma Roland 1,2 28.50+20.83*2 70.16 188.02

## 2023-05-25 ENCOUNTER — HOSPITAL ENCOUNTER (OUTPATIENT)
Dept: PHYSICAL THERAPY | Facility: CLINIC | Age: 73
Setting detail: THERAPIES SERIES
Discharge: HOME | End: 2023-05-25
Payer: MEDICARE

## 2023-05-25 PROCEDURE — 97110 THERAPEUTIC EXERCISES: CPT

## 2023-05-25 PROCEDURE — 97140 MANUAL THERAPY 1/> REGIONS: CPT

## 2023-05-25 NOTE — CARE COORDINATION
Medicare Cap   [x] Physical Therapy  [] Speech Therapy  [] Occupational therapy  *PT and Speech caps combine      $2230 Limit for PT and Speech combined  $2230 Limit for OT individually  At the beginning of the month where you expect to go over $2150, please add the [address] [place] [address] modifier      Patient Name: Breonna Ramachandran  YOB: 1950    Note:  This is an estimate of charges billed.      Date of [address] Name # units/ charge $$$ charge Daily Total Charge Ongoing Total $$$   5/8/23 IEman 1,1  97.03+20.83 117.86   5/19 Luan,gemma 1,2 28.50+20.83*2 70.16 188.02   5/25 Luan, man 3,1 28.50+22.29*2+20.83 93.91 281.93 no

## 2023-05-25 NOTE — FLOWSHEET NOTE
[x] [address]  Outpatient Rehabilitation &  Therapy  [address] [facility]  P: [phone]  F: [phone] [x] [address] [employer]  P: [phone]  F: [phone] [] [address]  [facility]   [place]: [phone]  F: [phone]      Physical Therapy Daily Treatment Note    Date:  2023  Patient Name:  Eliseo Auguste     :  1950  MRN: [id number]  ician: Maribell Pressley PA-C                  Insurance: medicare  Medical Diagnosis: L shoulder tendinitis    Rehab Codes: W10.898, M25.652  Onset Date: 23                 Next 's appt: none  Visit# / total visits: 3/12; Progress note for Medicare patient due at visit 10     Cancels/No Shows: 0/0    Subjective:    Pain:  [x] Yes  [] No Location: L shoulder Pain Rating: (0-10 scale) 5/10 neck; L shoulder 4/10 (lev scap)  Pain altered Tx:  [x] No  [] Yes  Action:  CommentsNeck is still ouchy but she feels it is her pillow. Not as tight with C rotation. Still points to base of skull. Tried tennis balls but prefers fingers. She thinks it is her pillow. As for the shoulder, increased mobility and decreased pain, expecially with shoulder IR (thumb up her side towards axilla).   .    Objective:  Modalities:   Treatment Location  Left      Right                          Position     []          []  [x] Supine    [] Prone   [] Side lying  [] Sitting                                            Treatment Modality   PRN Hot pack to C spine    10 min   1,2 Other:  man,ex      Precautions: standard  Exercises:  Exercise Reps/ Time Weight/ Level Completed Comments   Prone          I-T 15x ea 1 lbs x Y is painful   ER at 90/90 15  1 lbs x    Sidelying       Ext rot 2x10 2 lbs x    Gym       Corner stretch 3x30\"      Scaption 20 1lb     TB ER  3x10 peach x Issued lime TB    Towel stretch 3x30\"  x    ER @90/90 2x10 peach

## 2023-05-30 ENCOUNTER — HOSPITAL ENCOUNTER (OUTPATIENT)
Dept: PHYSICAL THERAPY | Facility: CLINIC | Age: 73
Setting detail: THERAPIES SERIES
Discharge: HOME | End: 2023-05-30
Payer: MEDICARE

## 2023-05-30 PROCEDURE — 97110 THERAPEUTIC EXERCISES: CPT

## 2023-05-30 PROCEDURE — 97140 MANUAL THERAPY 1/> REGIONS: CPT

## 2023-05-30 NOTE — FLOWSHEET NOTE
[x] [facility]  Outpatient Rehabilitation &  Therapy  [address]  P: [phone]  F: [phone] [x] [address] [facility]  P: [phone]  F: [phone]     Physical Therapy Daily Treatment Note    Date:  2023  Patient Name:  Nunu Baker     :  1950  MRN: [id number]  ician: Marium Car PA-C                  Insurance: medicare  Medical Diagnosis: L shoulder tendinitis    Rehab Codes: M66.160, M25.652  Onset Date: 23                 Next 's appt: none  Visit# / total visits: ; Progress note for Medicare patient due at visit 10     Cancels/No Shows: 0/0    Subjective:    Pain:  [x] Yes  [] No Location: L shoulder Pain Rating: (0-10 scale) 5/10 neck; L shoulder 0/10 (lev scap)  Pain altered Tx:  [x] No  [] Yes  Action:  Comments: no pain in L shoulder. Didn't do HEP as they were traveling a great deal over the weekend    Objective:  Modalities:   Treatment Location  Left      Right                          Position     []          []  [x] Supine    [] Prone   [] Side lying  [] Sitting                                            Treatment Modality   PRN Hot pack to C spine    10 min   1,2 Other:  man,ex      Precautions: standard  Exercises:  Exercise Reps/ Time Weight/ Level Completed Comments   UBE 4' 2.0 X    Prone          I-T 15x ea 1 lbs x Y is painful   ER at 90/90 15  1 lbs x    Sidelying       Ext rot 2x10 2 lbs x    Gym       Corner stretch 3x30\"      Scaption 20 2 lb     TB ER  3x10 orange X Issued lime TB    Towel stretch 3x30\"  X    ER @90/90 2x10 orange X    TB rows/extensions 20 blue X     TB up and out 20 orange X     OH press 20 3 lbs X                Other:  Manual  Long axis distraction 3x1', AP, lateral, and inferior mobs 2x10  Manual stretching into ER at 90/90, flex, and abd.      Specific Instructions for next treatment:   continue to progress RTC ex, gentle pec and bicep

## 2023-05-30 NOTE — CARE COORDINATION
Medicare Cap   [x] Physical Therapy  [] Speech Therapy  [] Occupational therapy  *PT and Speech caps combine      $2230 Limit for PT and Speech combined  $2230 Limit for OT individually  At the beginning of the month where you expect to go over $2150, please add the [address] [place] [address] modifier      Patient Name: Valarie Mcgregor  YOB: 1950    Note:  This is an estimate of charges billed.      Date of [address] Name # units/ charge $$$ charge Daily Total Charge Ongoing Total $$$   5/8/23 IEman 1,1  97.03+20.83 117.86   5/19 Luan,gemma 1,2 28.50+20.83*2 70.16 188.02   5/25 Luan, man 3,1 28.50+22.29*2+20.83 93.91 281.93     5/30 Luan, man 3,1  93.91 375.84

## 2023-06-09 ENCOUNTER — HOSPITAL ENCOUNTER (OUTPATIENT)
Dept: PHYSICAL THERAPY | Facility: CLINIC | Age: 73
Setting detail: THERAPIES SERIES
Discharge: HOME | End: 2023-06-09
Payer: MEDICARE

## 2023-06-09 PROCEDURE — 97110 THERAPEUTIC EXERCISES: CPT

## 2023-06-09 NOTE — FLOWSHEET NOTE
Single Arm  - 2 x daily - 5 x weekly - 2 sets - 10 reps  - Prone Shoulder Horizontal Abduction  - 2 x daily - 5 x weekly - 2 sets - 10 reps  - Corner Pec Major Stretch  - 2 x daily - 5 x weekly - 1 sets - 3 reps - 30 seconds hold  - Scaption with Dumbbells  - 2 x daily - 5 x weekly - 2 sets - 10 reps  - Standing Single Arm Shoulder External Rotation in Abduction with Anchored Resistance  - 2 x daily - 5 x weekly - 2 sets - 10 reps  - Supine Shoulder External Rotation in 45 Degrees Abduction AAROM with Dowel  - 2 x daily - 5 x weekly - 2 sets - 10 reps  - Standing Shoulder Internal Rotation Stretch with Towel  - 2 x daily - 5 x weekly - 1 sets - 3 reps - 30 seconds hold  - Wall Soham  - 2 x daily - 7 x weekly - 2 sets - 10 reps  - Standing Shoulder Single Arm PNF D2 Flexion with Anchored Resistance  - 2 x daily - 7 x weekly - 2 sets - 10 reps      Comprehension of Education:  [] Verbalizes understanding. [] Demonstrates understanding. [x] Needs review. [] Demonstrates/verbalizes HEP/Ed previously given. Plan: [x] Continue current frequency toward long and short term goals.     [] Specific Instructions for subsequent treatments:       Time In: 1004 Time Out: 1106    Electronically signed by:  Cris Cavazos, PT

## 2023-06-19 ENCOUNTER — APPOINTMENT (OUTPATIENT)
Dept: PHYSICAL THERAPY | Facility: CLINIC | Age: 73
End: 2023-06-19
Payer: MEDICARE

## 2023-06-21 ENCOUNTER — HOSPITAL ENCOUNTER (OUTPATIENT)
Dept: PHYSICAL THERAPY | Facility: CLINIC | Age: 73
Setting detail: THERAPIES SERIES
Discharge: HOME | End: 2023-06-21
Payer: MEDICARE

## 2023-06-21 PROCEDURE — 97140 MANUAL THERAPY 1/> REGIONS: CPT

## 2023-06-21 PROCEDURE — 97110 THERAPEUTIC EXERCISES: CPT

## 2023-06-21 NOTE — FLOWSHEET NOTE
[x] [facility]  Outpatient Rehabilitation &  Therapy  [address]  P: [phone]  F: [phone] [x] [address] [facility]  P: [phone]  F: [phone]     Physical Therapy Daily Treatment Note    Date:  2023  Patient Name:  Richard Romero     :  1950  MRN: [id number]  ician: Griselda Piper PA-C                  Insurance: medicare  Medical Diagnosis: L shoulder tendinitis    Rehab Codes: K96.502, M25.652  Onset Date: 23                 Next 's appt: none  Visit# / total visits: ; Progress note for Medicare patient due at visit 10     Cancels/No Shows: 0/0    Subjective:    Pain:  [x] Yes  [] No Location: L shoulder Pain Rating: (0-10 scale) 1.5/10 neck; L shoulder 0/10 (lev scap)  Pain altered Tx:  [x] No  [] Yes  Action:  Comments: \"hardly noticing it any more\"     Objective:  Modalities: none  Precautions: standard  Exercises:  Exercise Reps/ Time Weight/ Level Completed Comments   UBE 5' 2.5 X Alternate every 2.5'   Pulleys 3'  X flexion   Prone          I-T-ER at 90/90 2x10 ea 2 lbs  Y is painful   Sidelying       Ext rot 2x10 2 lbs     Gym       Corner stretch 3x30\"      Scaption 20 2 lb     TB ER  3-6-8 blue X Issued blue TB; 60 sec rest interval   Towel stretch 3x30\"  -=    ER @90/90 2x10 orange -    TB rows/extensions 20 blue -     TB up and out 20 lime      OH press 20 3 lbs --      sh abd 2x10 4 lbs X     Other:  Manual  Long axis distraction 3x1', AP, lateral, and inferior mobs 2x10  Manual stretching into ER at 90/90 with a towel , flex, and abd. Specific Instructions for next treatment:  Her significant other is to come in and he is to be shown how to stretch her shoulder.        Treatment Charges: Mins Units   []  Modalities     [x]  Ther Exercise 40 3   [x]  Manual Therapy 10 1   []  Ther Activities     []  Neuro Re-ed     []  Vasocompression     [] Gait     []  Other     Total

## 2023-06-21 NOTE — CARE COORDINATION
Medicare Cap   [x] Physical Therapy  [] Speech Therapy  [] Occupational therapy  *PT and Speech caps combine      $2230 Limit for PT and Speech combined  $2230 Limit for OT individually  At the beginning of the month where you expect to go over $2150, please add the [address] [place] [address] modifier      Patient Name: Liyah Gerard  YOB: 1950    Note:  This is an estimate of charges billed.      Date of [address] Name # units/ charge $$$ charge Daily Total Charge Ongoing Total $$$   5/8/23 IEman 1,1  97.03+20.83 117.86   5/19 Luan,gemma 1,2 81.86+22.95*5 70.16 188.02   5/25 Luan, man 3,1 28.50+22.29*2+20.83 93.91 281.93     5/30 Luan, man 3,1   375.84   6/9 Tex2, man2 28.50+22.29+20.83*2 92.45 93.91    6/12 Tex2, man   71.63 541.37   6/14 Tex3, man 28.50+22.29*3  95.37 636.74   6/21 Tex3, man   95.37 732.11

## 2023-07-05 ENCOUNTER — HOSPITAL ENCOUNTER (OUTPATIENT)
Dept: PHYSICAL THERAPY | Facility: CLINIC | Age: 73
Setting detail: THERAPIES SERIES
Discharge: HOME | End: 2023-07-05
Payer: MEDICARE

## 2023-07-05 PROCEDURE — 97110 THERAPEUTIC EXERCISES: CPT

## 2023-07-05 PROCEDURE — 97140 MANUAL THERAPY 1/> REGIONS: CPT

## 2023-07-05 NOTE — FLOWSHEET NOTE
[x] [facility]  Outpatient Rehabilitation &  Therapy  [address] [facility]  P: [phone]  F: [phone] [x] [address] [facility]  P: [phone]  F: [phone]     Physical Therapy Daily Treatment/Discharge Note    Date:  2023  Patient Name:  Tere Messina     :  1950  MRN: [id number]  ician: Jase Ross PA-C                  Insurance: medicare  Medical Diagnosis: L shoulder tendinitis    Rehab Codes: V49.383, M25.652  Onset Date: 23                 Next 's appt: none  Visit# / total visits:    Cancels/No Shows: 0/0    Subjective:    Pain:  [x] Yes  [] No Location: L shoulder Pain Rating: (0-10 scale) 1.5/10 neck; L shoulder 0/10 (lev scap)  Pain altered Tx:  [x] No  [] Yes  Action:  Comments: pt states she is ready for DC as she is no longer having issues with her shoulder. Neck is occasionally stiff in am with rotation, but eases off as day goes on.       Objective:  Modalities: none  Precautions: standard  Exercises:  Exercise Reps/ Time Weight/ Level Completed Comments   UBE 5' 2.5  Alternate every 2.5'   Pulleys 3'   flexion   Prone          I-T-ER at 90/90 2x10 ea 2 lbs  Y is painful   Sidelying       Ext rot 2x10 2 lbs     Gym       Corner stretch 3x30\"      Scaption 20 2 lb     TB ER  3-6-8 blue  Issued blue TB; 60 sec rest interval   Towel stretch 3x30\"      ER @90/90 2x10 orange     TB rows/extensions 20 blue      TB up and out 20 lime      OH press 20 3 lbs       sh abd 2x10 4 lbs      Other:    Treatment Charges: Mins Units   []  Modalities     [x]  Ther Exercise 13 1   [x]  Manual Therapy 10 1   []  Ther Activities     []  Neuro Re-ed     []  Vasocompression     [] Gait     []  Other     Total Treatment time        5 6.21 6/ 6/ 7/5    Left Right Left Left Left   Shoulder Flex 9.1 17.2 10.5 11.9 11.8   ABD 6.3 12.5 8.0 10.9 12.2   ER @ 0 14.1 19.4 14.0 14.2 13.8       Assessment: [x]

## 2023-07-05 NOTE — CARE COORDINATION
Medicare Cap   [x] Physical Therapy  [] Speech Therapy  [] Occupational therapy  *PT and Speech caps combine      $2230 Limit for PT and Speech combined  $2230 Limit for OT individually  At the beginning of the month where you expect to go over $2150, please add the [facility] modifier      Patient Name: Ashley Merritt  YOB: 1950    Note:  This is an estimate of charges billed.      Date of [facility] Name # units/ charge $$$ charge Daily Total Charge Ongoing Total $$$   5/8/23 IEman 1,1  97.03+20.83 117.86   5/19 Luan,gemma 1,2 28.50+20.83*2 70.16 188.02   5/25 Luan, man 3,1 28.50+22.29*2+20.83 93.91 281.93     5/30 Luan, man 3,1   375.84   6/9 Tex2, man2 28.50+22.29+20.83*2 92.45 93.91    6/12 Tex2, man   71.63 541.37   6/14 Tex3, man 28.50+22.29*3  95.37 636.74   6/21 Tex3, man   95.37 732.11   7/5 Luan, man 28.50+20.83  49.33 781.44

## 2023-10-05 ENCOUNTER — HOSPITAL ENCOUNTER (OUTPATIENT)
Dept: PHYSICAL THERAPY | Facility: CLINIC | Age: 73
Setting detail: THERAPIES SERIES
Discharge: HOME | End: 2023-10-05
Payer: MEDICARE

## 2023-10-05 PROCEDURE — 97140 MANUAL THERAPY 1/> REGIONS: CPT

## 2023-10-05 PROCEDURE — 97110 THERAPEUTIC EXERCISES: CPT

## 2023-10-05 NOTE — CARE COORDINATION
Medicare Cap   [x] Physical Therapy  [] Speech Therapy  [] Occupational therapy  *PT and Speech caps combine      $2230 Limit for PT and Speech combined  $2230 Limit for OT individually  At the beginning of the month where you expect to go over $2150, please add the [facility] modifier      Patient Name: Kiana Ferrera  YOB: 1950    Note:  This is an estimate of charges billed.      Date of [facility] [facility] Name # units/ charge $$$ charge Daily Total Charge Ongoing Total $$$   5/8/23 IEman 1,1  97.03+20.83 117.86   5/19 Luan,gemma 1,2 88.95+47.76*8 70.16 188.02   5/25 Luan, man 3,1 28.50+22.29*2+20.83 93.91 281.93     5/30 Luan, man 3,1   375.84   6/9 Tex2, man2 28.50+22.29+20.83*2 92.45 93.91    6/12 Tex2, man   71.63 541.37   6/14 Tex3, man 28.50+22.29*3  95.37 636.74   6/21 Tex3, man   95.37 732.11   10/5 IE, man, luan 95.95+21.75+20.32  138.02 870.13

## 2023-10-10 ENCOUNTER — HOSPITAL ENCOUNTER (OUTPATIENT)
Dept: PHYSICAL THERAPY | Facility: CLINIC | Age: 73
Setting detail: THERAPIES SERIES
Discharge: HOME | End: 2023-10-10
Payer: MEDICARE

## 2023-10-10 PROCEDURE — 97140 MANUAL THERAPY 1/> REGIONS: CPT

## 2023-10-10 PROCEDURE — 97110 THERAPEUTIC EXERCISES: CPT

## 2023-10-10 NOTE — CARE COORDINATION
Medicare Cap   [x] Physical Therapy  [] Speech Therapy  [] Occupational therapy  *PT and Speech caps combine      $2230 Limit for PT and Speech combined  $2230 Limit for OT individually  At the beginning of the month where you expect to go over $2150, please add the [address] [facility] modifier      Patient Name: Prabha Ramírez  YOB: 1950    Note:  This is an estimate of charges billed.      Date of 705 [facility] Name # units/ charge $$$ charge Daily Total Charge Ongoing Total $$$   5/8/23 IEman 1,1  97.03+20.83 117.86   5/19 Luan,gemma 1,2 24.21+83.39*4 70.16 188.02   5/25 Luan, man 3,1 28.50+22.29*2+20.83 93.91 281.93     5/30 Luan, man 3,1   375.84   6/9 Tex2, man2 28.50+22.29+20.83*2 92.45 93.91    6/12 Tex2, man   71.63 541.37   6/14 Tex3, man 28.50+22.29*3  95.37 636.74   6/21 Tex3, man   95.37 732.11   10/5 IE, man, luan 95.95+21.75+20.32  138.02 870.13   10/10 Tex3, man 28.50+21.75*+20.83  92.83 962.96

## 2023-10-10 NOTE — FLOWSHEET NOTE
[] [address]  [address] [facility].  [phone]  F: [phone] [] [address] [facility]  [address]   [address]  P: [phone]  F: [phone] [] [address]  [address] [facility]  P: [phone]  F: [phone] [] [place]: [phone]  F: [phone] [] [address]  [address] [facility]   [address]   P: [phone]  F: [phone]  [] [address] [facility].   P: [phone]  F: [phone] [] [address] [facility]  [address]  P: [phone]  F: [phone] [] [address]  [address] [facility]  [place]: [phone]  F: [phone] [] [address] [facility] [address]  [place]: [phone]  F: [phone]      Physical Therapy Daily Treatment Note    Date:  10/10/2023  Patient Name:  Chris Ramírez     :  1950  MRN: [id number]  Physician: Dr. Renny Veras: Medicare  Medical Diagnosis: L hip OA                        Rehab Codes: M25.552, M25.652  Onset date: 23                 Next 's appt. :    Visit# / total visits: ; Progress note for Medicare patient due at visit 10     Cancels/No Shows: 0/0    Subjective:    Pain:  [x] Yes  [] No Location:L hip Pain Rating: (0-10 scale) --/10  Pain altered Tx:  [x] No  [] Yes  Action:  Comments:  compliant with HEP.   No changes    Objective:  Modalities: none  Precautions: L hip OA  Exercises:  Exercise Reps/ Time Weight/ Level Completed Comments   Bike 5'     x Seat 1

## 2023-10-13 ENCOUNTER — HOSPITAL ENCOUNTER (OUTPATIENT)
Dept: PHYSICAL THERAPY | Facility: CLINIC | Age: 73
Setting detail: THERAPIES SERIES
Discharge: HOME | End: 2023-10-13
Payer: MEDICARE

## 2023-10-13 PROCEDURE — 97140 MANUAL THERAPY 1/> REGIONS: CPT

## 2023-10-13 PROCEDURE — 97110 THERAPEUTIC EXERCISES: CPT

## 2023-10-13 NOTE — CARE COORDINATION
Medicare Cap   [x] Physical Therapy  [] Speech Therapy  [] Occupational therapy  *PT and Speech caps combine      $2230 Limit for PT and Speech combined  $2230 Limit for OT individually  At the beginning of the month where you expect to go over $2150, please add the 1[address] [facility] modifier      Patient Name: Quin Barton  YOB: 1950    Note:  This is an estimate of charges billed.      Date of 705 [facility] Name # units/ charge $$$ charge Daily Total Charge Ongoing Total $$$   5/8/23 IEman 1,1  97.03+20.83 117.86   5/19 Luan,gemma 1,2 74.70+34.42*8 70.16 188.02   5/25 Luan, man 3,1 28.50+22.29*2+20.83 93.91 281.93     5/30 Luan, man 3,1   375.84   6/9 Tex2, man2 28.50+22.29+20.83*2 92.45 93.91    6/12 Tex2, man   71.63 541.37   6/14 Tex3, man 28.50+22.29*3  95.37 636.74   6/21 Tex3, man   95.37 732.11   10/5 IE, man, luan 95.95+21.75+20.32  138.02 870.13   10/10 Tex3, man 28.50+21.75*+20.83  92.83 962.96   10/13 Luan, man2 28.50+20.83*2  70.16 1033.12

## 2023-10-13 NOTE — FLOWSHEET NOTE
[x] [facility]  Outpatient Rehabilitation &  Therapy  [address] [facility]  P: [phone]  F: [phone] [x] [address] [facility]  P: [phone]  F: [phone]     Physical Therapy Daily Treatment Note    Date:  10/13/2023  Patient Name:  Chanel Beth     :  1950  MRN: [id number]  Physician: Dr. Kim Ballesteros: Medicare  Medical Diagnosis: L hip OA                        Rehab Codes: M25.552, M25.652  Onset date: 23                 Next Dr's appt. :    Visit# / total visits: 3/12; Progress note for Medicare patient due at visit 10     Cancels/No Shows: 0/0    Subjective:    Pain:  [x] Yes  [] No Location:L hip Pain Rating: (0-10 scale) --/10  Pain altered Tx:  [x] No  [] Yes  Action:  Comments:  compliant with HEP.   No changes    Objective:  Modalities: none  Precautions: L hip OA  Exercises:  Exercise Reps/ Time Weight/ Level Completed Comments   Bike 5'     x Seat 1               Supine           Banded bridges 20 blue -     1 legged bridges 2x10   -     Ronnell stretch 3x30\"   -     Sidelying           Clamshells 20x ea   x Ball under feet, then towel   Hip abd 2x10   x     Prone        2 pillows   Hip ext  2x10   -     Flying squirrels 20   -      Foot push 10x10\"   -                 Other:  Manual   Long axis distraction 3x1'  PA and lateral mobs 3x10  DI to glut med and piriformis     Specific Instructions for next treatment:  Continue to improve hip strength and ER mobility  Issue LEFI      58 6.21 6/12 6/21 7/5 10/10      Left Right Left Left Left left    Shoulder Flex 9.1 17.2 10.5 11.9 11.8 10.8    ABD 6.3 12.5 8.0 10.9 12.2 10.5    ER @ 0 14.1 19.4 14.0 14.2 13.8 16.0       10/5 10/5 10/13 10/5 10/5 10/13       Left Right Left Left Right Left        AROM AROM PROM strength strength strength      Ext wnl    26.8 44.9 38.0      ER 0 45 35 33.4 46.5 43.4      IR 60 55

## 2023-10-16 ENCOUNTER — HOSPITAL ENCOUNTER (OUTPATIENT)
Dept: PHYSICAL THERAPY | Facility: CLINIC | Age: 73
Setting detail: THERAPIES SERIES
Discharge: HOME | End: 2023-10-16
Payer: MEDICARE

## 2023-10-16 PROCEDURE — 97140 MANUAL THERAPY 1/> REGIONS: CPT

## 2023-10-16 PROCEDURE — 97110 THERAPEUTIC EXERCISES: CPT

## 2023-10-16 NOTE — FLOWSHEET NOTE
[x] [facility]  Outpatient Rehabilitation &  Therapy  [address] [facility]  P: [phone]  F: [phone] [x] [address] [facility]  P: [phone]  F: [phone]     Physical Therapy Daily Treatment Note    Date:  10/16/2023  Patient Name:  Thad Son     :  1950  MRN: [id number]  Physician: Dr. Raymundo Fiore: Medicare  Medical Diagnosis: L hip OA                        Rehab Codes: M25.552, M25.652  Onset date: 23                 Next Dr's appt.: 11/3   Visit# / total visits: ; Progress note for Medicare patient due at visit 10     Cancels/No Shows: 0/0    Subjective:    Pain:  [x] Yes  [] No Location:L hip Pain Rating: (0-10 scale) --/10  Pain altered Tx:  [x] No  [] Yes  Action:  Comments:  she reports decreased pain with stairs.  Doing HEP 1x/day    Objective:  Modalities: none  Precautions: L hip OA  Exercises:  Exercise Reps/ Time Weight/ Level Completed Comments   Bike 5'     x Seat 1               Supine           Banded bridges 20 blue -     1 legged bridges 2x10   -     Ronnell stretch 3x30\"   -     Sidelying           Clamshells 20x ea  MRE x Ball under feet, then towel   Hip abd 2x10        Prone        2 pillows   Hip ext  2x10   -     Flying squirrels 20   x     Gym         TG squats/lat squats *         Standing clamsthells *                           Other:  Manual   Long axis distraction 3x1'  PA and lateral mobs 3x10       Specific Instructions for next treatment:  Continue to improve hip strength and ER mobility  Issue LEFI       6.21  6 7 10/10      Left Right Left Left Left left    Shoulder Flex 9.1 17.2 10.5 11.9 11.8 10.8    ABD 6.3 12.5 8.0 10.9 12.2 10.5    ER @ 0 14.1 19.4 14.0 14.2 13.8 16.0       10/5 10/5 10/13 10/16 10/5 10/5 10/13 10/16      Left Right Left Left Left Right Left  Left      AROM AROM PROM A/PROM strength strength strength

## 2023-10-16 NOTE — CARE COORDINATION
Medicare Cap   [x] Physical Therapy  [] Speech Therapy  [] Occupational therapy  *PT and Speech caps combine      $2230 Limit for PT and Speech combined  $2230 Limit for OT individually  At the beginning of the month where you expect to go over $2150, please add the 1[facility] modifier      Patient Name: Paresh Greco  YOB: 1950    Note:  This is an estimate of charges billed.      Date of 705 [facility] Name # units/ charge $$$ charge Daily Total Charge Ongoing Total $$$   5/8/23 IEman 1,1  97.03+20.83 117.86   5/19 Luan,gemma 1,2 53.55+99.77*4 70.16 188.02   5/25 Luan, man 3,1 28.50+22.29*2+20.83 93.91 281.93     5/30 Luan, man 3,1   375.84   6/9 Tex2, man2 28.50+22.29+20.83*2 92.45 93.91    6/12 Tex2, man   71.63 541.37   6/14 Tex3, man 28.50+22.29*3  95.37 636.74   6/21 Tex3, man   95.37 732.11   10/5 IE, man, luan 95.95+21.75+20.32  138.02 870.13   10/10 Tex3, man 28.50+21.75*+20.83  92.83 962.96   10/13 Luan, man2 28.50+20.83*2  70.16 1033.12   10/16 Tex2, man2 28.50+21.75+20.32*2  90.89 1124.01

## 2023-10-18 ENCOUNTER — HOSPITAL ENCOUNTER (OUTPATIENT)
Dept: PHYSICAL THERAPY | Facility: CLINIC | Age: 73
Setting detail: THERAPIES SERIES
Discharge: HOME | End: 2023-10-18
Payer: MEDICARE

## 2023-10-18 PROCEDURE — 97110 THERAPEUTIC EXERCISES: CPT

## 2023-10-18 PROCEDURE — 97140 MANUAL THERAPY 1/> REGIONS: CPT

## 2023-10-18 NOTE — FLOWSHEET NOTE
[x] 12 [facility], [place]  Outpatient Rehabilitation &  Therapy  [address] [facility]  P: [phone]  F: [phone] [x] [address] [facility]  P: [phone]  F: [phone]     Physical Therapy Daily Treatment Note    Date:  10/18/2023  Patient Name:  Quin Barton     :  1950  MRN: [id number]  Physician: Dr. Cassidy Levo: Medicare  Medical Diagnosis: L hip OA                        Rehab Codes: M25.552, M25.652  Onset date: 23                 Next Dr's appt.: 11/3   Visit# / total visits: ; Progress note for Medicare patient due at visit 10     Cancels/No Shows: 0/0    Subjective:    Pain:  [x] Yes  [] No Location:L hip Pain Rating: (0-10 scale) --/10  Pain altered Tx:  [x] No  [] Yes  Action:  Comments:  continues to report that she is doing well.      Objective:  Modalities: none  Precautions: L hip OA  Exercises:  Exercise Reps/ Time Weight/ Level Completed Comments   Bike 5'     x Seat 1              Supine          Banded bridges 20 blue      1 legged bridges 2x10        Ronnell stretch 3x30\"        Sidelying          Clamshells 20x ea  MRE  Ball under feet, then towel   Hip abd 2x10        Prone       2 pillows   Hip ext  2x10        Flying squirrels 20        Gym         TG squats/lat squats *         Standing clamsthells 2x15 orange xx    Step downs 2 ways 15 6\" xx Post and lateral                 Other:  Manual   Long axis distraction 3x1'  PA and lateral mobs 3x10  Inf mobs 3x10 with 90 deg hip flexion  Manual stretching into ER in prone and supine  Manual hasmtring stretch 3x30\"       Specific Instructions for next treatment:  Continue to improve hip strength and ER mobility  Issue LEFI       6.21 612 6 7/5 10/10      Left Right Left Left Left left    Shoulder Flex 9.1 17.2 10.5 11.9 11.8 10.8    ABD 6.3 12.5 8.0 10.9 12.2 10.5    ER @ 0 14.1 19.4 14.0 14.2 13.8 16.0       105

## 2023-10-18 NOTE — CARE COORDINATION
Medicare Cap   [x] Physical Therapy  [] Speech Therapy  [] Occupational therapy  *PT and Speech caps combine      $2230 Limit for PT and Speech combined  $2230 Limit for OT individually  At the beginning of the month where you expect to go over $2150, please add the 1111 [facility] modifier      Patient Name: Sally Pugh  YOB: 1950    Note:  This is an estimate of charges billed.      Date of 705 [facility] Name # units/ charge $$$ charge Daily Total Charge Ongoing Total $$$   5/8/23 IEman 1,1  97.03+20.83 117.86   5/19 Reyes,gemma 1,2 39.73+45.29*0 70.16 188.02   5/25 Reyes, man 3,1 28.50+22.29*2+20.83 93.91 281.93     5/30 Reyes, man 3,1   375.84   6/9 Tex2, man2 28.50+22.29+20.83*2 92.45 93.91    6/12 Tex2, man   71.63 541.37   6/14 Tex3, man 28.50+22.29*3  95.37 636.74   6/21 Tex3, man   95.37 732.11   10/5 IE, man, reyes 95.95+21.75+20.32  138.02 870.13   10/10 Tex3, man 28.50+21.75*+20.83  92.83 962.96   10/13 Witry, man2 28.50+20.83*2  70.16 1033.12   10/16 Tex2, man2 28.50+21.75+20.32*2  90.89 1124.01   10/18 Reyes, man2 28.50+20.32*2  70.16 1194.17

## 2023-10-23 ENCOUNTER — HOSPITAL ENCOUNTER (OUTPATIENT)
Dept: PHYSICAL THERAPY | Facility: CLINIC | Age: 73
Setting detail: THERAPIES SERIES
Discharge: HOME | End: 2023-10-23
Payer: MEDICARE

## 2023-10-23 PROCEDURE — 97140 MANUAL THERAPY 1/> REGIONS: CPT

## 2023-10-23 PROCEDURE — 97110 THERAPEUTIC EXERCISES: CPT

## 2023-10-23 NOTE — FLOWSHEET NOTE
[x] 12 [facility]  Outpatient Rehabilitation &  Therapy  [address] [facility]  P: [phone]  F: [phone] [x] [address] [facility]  P: [phone]  F: [phone]     Physical Therapy Daily Treatment Note    Date:  10/23/2023  Patient Name:  Josephine Sanchez     :  1950  MRN: [id number]  Physician: Dr. Barron People: Medicare  Medical Diagnosis: L hip OA                        Rehab Codes: M25.552, M25.652  Onset date: 23                 Next Dr's appt.: 11/3   Visit# / total visits: ; Progress note for Medicare patient due at visit 10     Cancels/No Shows: 0/0    Subjective:    Pain:  [x] Yes  [] No Location:L hip Pain Rating: (0-10 scale) --/10  Pain altered Tx:  [x] No  [] Yes  Action:  Comments:  reports that she could feel it after her last visit but it was a good kind of pain. She is asking about possbily trying a return to run.       Objective:  Modalities: none  Precautions: L hip OA  Exercises:  Exercise Reps/ Time Weight/ Level Completed Comments   Bike 5'     x Seat 1              Supine          Banded bridges 20 blue      1 legged bridges 2x10        Ronnell stretch 3x30\"        Sidelying          Clamshells 20x ea  MRE  Ball under feet, then towel   Hip abd 2x10        Prone       2 pillows   Hip ext  2x10        Flying squirrels 20        Gym         TG squats/lat squats *         Standing clamsthells 2x15 orange xx    Step downs 2 ways 15 6\" xx Post and lateral                 Other:  Manual   Long axis distraction 3x1'  PA and lateral mobs 3x10  Inf mobs 3x10 with 90 deg hip flexion  Manual stretching into ER in prone and supine  Manual hasmtring stretch 3x30\"       Specific Instructions for next treatment:  Continue to improve hip strength and ER mobility  Issue LEFI      5 6.21 6 6 7/5 10/10      Left Right Left Left Left left    Shoulder Flex 9.1 17.2 10.5 11.9

## 2023-10-23 NOTE — CARE COORDINATION
Medicare Cap   [x] Physical Therapy  [] Speech Therapy  [] Occupational therapy  *PT and Speech caps combine      $2230 Limit for PT and Speech combined  $2230 Limit for OT individually  At the beginning of the month where you expect to go over $2150, please add the 1111 [facility] modifier      Patient Name: Jorge Moore  YOB: 1950    Note:  This is an estimate of charges billed.      Date of 705 [facility] Name # units/ charge $$$ charge Daily Total Charge Ongoing Total $$$   5/8/23 IEman 1,1  97.03+20.83 117.86   5/19 Luan,gemma 1,2 09.26+24.17*4 70.16 188.02   5/25 Luan, man 3,1 28.50+22.29*2+20.83 93.91 281.93     5/30 Luan, man 3,1   375.84   6/9 Tex2, man2 28.50+22.29+20.83*2 92.45 93.91    6/12 Tex2, man   71.63 541.37   6/14 Tex3, man 28.50+22.29*3  95.37 636.74   6/21 Tex3, man   95.37 732.11   10/5 IE, man, luan 95.95+21.75+20.32  138.02 870.13   10/10 Tex3, man 28.50+21.75*+20.83  92.83 962.96   10/13 Witry, man2 28.50+20.83*2  70.16 1033.12   10/16 Tex2, man2 28.50+21.75+20.32*2  90.89 1124.01   10/18 Luan, man2 28.50+20.32*2  70.16 1194.17   10/23 Tex2, man2 28.13+21.75+20.32*2    90.52 1284.69

## 2023-10-25 ENCOUNTER — HOSPITAL ENCOUNTER (OUTPATIENT)
Dept: PHYSICAL THERAPY | Facility: CLINIC | Age: 73
Setting detail: THERAPIES SERIES
Discharge: HOME | End: 2023-10-25
Payer: MEDICARE

## 2023-10-25 PROCEDURE — 97110 THERAPEUTIC EXERCISES: CPT

## 2023-10-25 PROCEDURE — 97140 MANUAL THERAPY 1/> REGIONS: CPT

## 2023-10-25 NOTE — FLOWSHEET NOTE
goals: to decrease L hip pain    Pt. Education:  [x] Yes  [] No  [x] Reviewed Prior HEP/Ed  Method of Education: [x] Verbal  [] Demo  [x] Written  Access Code: [email or web address]  URL: [email or web address]  Date: 10/25/2023  Prepared by: Roselyn Mckeon    Exercises  - Clamshell  - 2 x daily - 5 x weekly - 2 sets - 10 reps  - Sidelying Hip Abduction  - 2 x daily - 5 x weekly - 2 sets - 10 reps  - Supine Bridge with Resistance Band  - 2 x daily - 5 x weekly - 2 sets - 10 reps  - Single Leg Bridge  - 2 x daily - 5 x weekly - 2 sets - 10 reps  - Prone heel squeeze + lift  - 2 x daily - 5 x weekly - 2 sets - 10 reps  - Prone Hip External Rotation with Resistance  - 2 x daily - 5 x weekly - 2 sets - 10 reps  - Ronnell Stretch on Table  - 2 x daily - 5 x weekly - 1 sets - 3 reps - 30 sec hold  - Forward Step Up with Counter Support  - 2 x daily - 5 x weekly - 2 sets - 10 reps  - Lateral Step Up with Counter Support  - 2 x daily - 5 x weekly - 2 sets - 10 reps  - Forward Step Down with Heel Tap and Counter Support  - 2 x daily - 5 x weekly - 2 sets - 10 reps  - Side Stepping with Resistance at Feet  - 2 x daily - 5 x weekly - 4 sets - 20 reps    Comprehension of Education:  [] Verbalizes understanding. [] Demonstrates understanding. [x] Needs review. [] Demonstrates/verbalizes HEP/Ed previously given. Plan: [x] Continue current frequency toward long and short term goals.     [] Specific Instructions for subsequent treatments:       Time In:1000  Time Out: 1100    Electronically signed by:  Roselyn Mckeon, PT

## 2023-10-25 NOTE — CARE COORDINATION
Medicare Cap   [x] Physical Therapy  [] Speech Therapy  [] Occupational therapy  *PT and Speech caps combine      $2230 Limit for PT and Speech combined  $2230 Limit for OT individually  At the beginning of the month where you expect to go over $2150, please add the 1111 [facility] modifier      Patient Name: Jesus Pete  YOB: 1950    Note:  This is an estimate of charges billed.      Date of 705 [facility] Name # units/ charge $$$ charge Daily Total Charge Ongoing Total $$$   5/8/23 IEman 1,1  97.03+20.83 117.86   5/19 Luan,gemma 1,2 65.64+50.33*0 70.16 188.02   5/25 Luan, man 3,1 28.50+22.29*2+20.83 93.91 281.93     5/30 Luan, man 3,1   375.84   6/9 Tex2, man2 28.50+22.29+20.83*2 92.45 93.91    6/12 Tex2, man   71.63 541.37   6/14 Tex3, man 28.50+22.29*3  95.37 636.74   6/21 Tex3, man   95.37 732.11   10/5 IE, man, luan 95.95+21.75+20.32  138.02 870.13   10/10 Tex3, man 28.50+21.75*+20.83  92.83 962.96   10/13 Witry, man2 28.50+20.83*2  70.16 1033.12   10/16 Tex2, man2 28.50+21.75+20.32*2  90.89 1124.01   10/18 Witry, man2 28.50+20.32*2  70.16 1194.17   10/23 Tex2, man2 28.13+21.75+20.32*2    90.52 1284.69   10/25 Tex3, man1   95.37 1380.06

## 2023-10-30 ENCOUNTER — HOSPITAL ENCOUNTER (OUTPATIENT)
Dept: PHYSICAL THERAPY | Facility: CLINIC | Age: 73
Setting detail: THERAPIES SERIES
Discharge: HOME | End: 2023-10-30
Payer: MEDICARE

## 2023-10-30 PROCEDURE — 97110 THERAPEUTIC EXERCISES: CPT

## 2023-10-30 PROCEDURE — 97140 MANUAL THERAPY 1/> REGIONS: CPT

## 2023-10-30 NOTE — CARE COORDINATION
Medicare Cap   [x] Physical Therapy  [] Speech Therapy  [] Occupational therapy  *PT and Speech caps combine      $2230 Limit for PT and Speech combined  $2230 Limit for OT individually  At the beginning of the month where you expect to go over $2150, please add the 1111 [facility] modifier      Patient Name: Ephraim Lawson  YOB: 1950    Note:  This is an estimate of charges billed.      Date of 705 [facility] Name # units/ charge $$$ charge Daily Total Charge Ongoing Total $$$   5/8/23 IEman 1,1  97.03+20.83 117.86   5/19 Luan,gemma 1,2 28.50+20.83*2 70.16 188.02   5/25 Luan, man 3,1 28.50+22.29*2+20.83 93.91 281.93     5/30 Luan, man 3,1   375.84   6/9 Tex2, man2 28.50+22.29+20.83*2 92.45 93.91    6/12 Tex2, man   71.63 541.37   6/14 Tex3, man 28.50+22.29*3  95.37 636.74   6/21 Tex3, man   95.37 732.11   10/5 IE, man, luan 95.95+21.75+20.32  138.02 870.13   10/10 Tex3, man 28.50+21.75*+20.83  92.83 962.96   10/13 Witry, man2 28.50+20.83*2  70.16 1033.12   10/16 Tex2, man2 28.50+21.75+20.32*2  90.89 1124.01   10/18 [address], [place] 21.54+21.91*5  70.16 1194.17   10/23 Tex2, man2 28.13+21.75+20.32*2    90.52 1284.69   10/25 Tex3, man1   95.37 1380.06   11/30 Tex2, man   71.63 1451.69

## 2023-10-30 NOTE — FLOWSHEET NOTE
[x] 12 [facility]  Outpatient Rehabilitation &  Therapy  [address] [facility]  P: [phone]  F: [phone] [x] [address] [facility]  P: [phone]  F: [phone]     Physical Therapy Daily Treatment Note    Date:  10/30/2023  Patient Name:  Justin Jaime     :  1950  MRN: [id number]  Physician: Dr. Lizarraga Arrow: Medicare  Medical Diagnosis: L hip OA                        Rehab Codes: M25.552, M25.652  Onset date: 23                 Next Dr's appt.: 11/3   Visit# / total visits: ; Progress note for Medicare patient due at visit 10     Cancels/No Shows: 0/0    Subjective:    Pain:  [x] Yes  [] No Location:L hip Pain Rating: (0-10 scale) --/10  Pain altered Tx:  [x] No  [] Yes  Action:  Comments:  enters stating that the hip is feeling good. She had some discomfort after the last appt, but better overall and barely noticed it yesterday.      Objective:  Modalities: none  Precautions: L hip OA  Exercises:  Exercise Reps/ Time Weight/ Level Completed Comments   Bike 5'      Seat 1              Supine          Banded bridges 20 blue      1 legged bridges 2x10        Ronnell stretch 3x30\"        Sidelying          Clamshells 20x ea  MRE  Ball under feet, then towel   Hip abd 2x10        Prone       2 pillows   Hip ext  2x10        Flying squirrels 20        Gym        4 way hip 2x10 blue  Issued blue band   TG squats/lat squats 2x10 L13/14  Start at L14   Standing clamsthells 2x15 orange     Step downs 2 ways 10x ea 4\"  Post and lateral   Monster walks 4x20' orange     Heel taps 10 4\"     Alter G 4'/1' x 6 2.5/5.0 mph x 36-65-39-85-90-95%  Carriage 6, XS   Other:  Manual   Long axis distraction 3x1'  PA and lateral mobs 3x10  Inf mobs 3x10 with 90 deg hip flexion  Manual stretching into ER in prone and supine  Manual hasmtring stretch 3x30\"           6.21  7 10/10      Left

## 2023-11-01 ENCOUNTER — HOSPITAL ENCOUNTER (OUTPATIENT)
Dept: PHYSICAL THERAPY | Facility: CLINIC | Age: 73
Setting detail: THERAPIES SERIES
Discharge: HOME | End: 2023-11-01
Payer: MEDICARE

## 2023-11-01 PROCEDURE — 97110 THERAPEUTIC EXERCISES: CPT

## 2023-11-01 PROCEDURE — 97140 MANUAL THERAPY 1/> REGIONS: CPT

## 2023-11-01 NOTE — CARE COORDINATION
Medicare Cap   [x] Physical Therapy  [] Speech Therapy  [] Occupational therapy  *PT and Speech caps combine      $2230 Limit for PT and Speech combined  $2230 Limit for OT individually  At the beginning of the month where you expect to go over $2150, please add the 1111 [facility] modifier      Patient Name: Tere Messina  YOB: 1950    Note:  This is an estimate of charges billed.      Date of 705 [facility] Name # units/ charge $$$ charge Daily Total Charge Ongoing Total $$$   5/8/23 IEman 1,1  97.03+20.83 117.86   5/19 Luan,gemma 1,2 28.50+20.83*2 70.16 188.02   5/25 Luan, man 3,1 28.50+22.29*2+20.83 93.91 281.93     5/30 Luan, man 3,1   375.84   6/9 Tex2, man2 28.50+22.29+20.83*2 92.45 93.91    6/12 Tex2, man   71.63 541.37   6/14 Tex3, man 28.50+22.29*3  95.37 636.74   6/21 Tex3, man   95.37 732.11   10/5 IE, man, luan 95.95+21.75+20.32  138.02 870.13   10/10 Tex3, man 28.50+21.75*+20.83  92.83 962.96   10/13 Witry, man2 28.50+20.83*2  70.16 1033.12   10/16 Tex2, man2 28.50+21.75+20.32*2  90.89 1124.01   10/18 [place], [place] 11.17+83.21*9  70.16 1194.17   10/23 Tex2, man2 28.13+21.75+20.32*2    90.52 1284.69   10/25 Tex3, man1   95.37 1380.06   11/30 Tex2, man   71.63 1451.69   11/1 Tex3, man   92.83 1544.52

## 2023-11-06 ENCOUNTER — HOSPITAL ENCOUNTER (OUTPATIENT)
Dept: PHYSICAL THERAPY | Facility: CLINIC | Age: 73
Setting detail: THERAPIES SERIES
Discharge: HOME | End: 2023-11-06
Payer: MEDICARE

## 2023-11-06 NOTE — FLOWSHEET NOTE
[] [address]  [address] [facility].  [phone]  F: [phone] [] [address] [facility]  [address]   [address]  P: [phone]  F: [phone] [] [address]  [address] [facility]  P: [phone]  F: [phone] [] [facility] [place]: [phone]  F: [phone] [] [address] [facility]  [address] [facility]   [address]   P: [phone]  F: [phone]  [] [address] [facility].   P: [phone]  F: [phone] [] [address] [facility]  [address] [facility]. [address]  P: [phone]  F: [phone] [] [address] [facility]  [address] [facility]  [place]: [phone]  F: [phone] [] [address] [facility]  [place]: [phone]  F: [phone]      Therapy Cancel/No Show note    Date: 2023  Patient: Faisal Dang  : 1950  MRN: [id number]    Cancels/No Shows to date: cancel will not count     For today's appointment patient:    [x]  Cancelled    [] Rescheduled appointment    [] No-show     Reason given by patient:    []  Patient ill    []  Conflicting appointment    [] No transportation      [] Conflict with work    [] No reason given    [] Weather related    [] COVID-19    [x] Other:      Comments:  pt had follow up with doctor who stated it would be very risky to run so at this time would like to wait to see primary therapist about plans for running or not running.        [x] Next appointment was confirmed    Electronically signed by: Tessa Connelly PTA

## 2023-11-08 ENCOUNTER — HOSPITAL ENCOUNTER (OUTPATIENT)
Dept: PHYSICAL THERAPY | Facility: CLINIC | Age: 73
Setting detail: THERAPIES SERIES
Discharge: HOME | End: 2023-11-08
Payer: MEDICARE

## 2023-11-08 PROCEDURE — 97110 THERAPEUTIC EXERCISES: CPT

## 2023-11-08 NOTE — DISCHARGE SUMMARY
1st-80%  2nd-80%  3rd-80%  4th-85%  5th-90%  6th-95%  Carriage 6, XS  2 min cool down at 100%   Other:       10/5 10/5 10/13 10/16 10/23 10/5 10/5 10/13 10/16 10/23     Left Right Left Left Left Left Right Left  Left Left     AROM AROM PROM A/PROM A/PROM strength strength strength Strength strength    Ext wnl      26.8 44.9 38.0 37.7 67.2    Ext rotation 0 45 35 20/39 22/38 33.4 46.5 43.4 52.2 52.5    Ext rot in prone  (2 pillows)         18.3 16.6    IR 60 55              ABD wnl      19.7 41.8 18.2 21.5 35.2    Strength measures are in lbs and measured with a handheld dynamometer. Numbers in red indicate improved values. Treatment Charges: Mins Units   []  Modalities     [x]  Ther Exercise 50 3   [x]  Manual Therapy     []  Ther Activities     []  Neuro Re-ed     []  Vasocompression     [] Gait     []  Other     Total Treatment time 50 3     Specific instructions for next visit:   Alter g 4'/1' x 6 starting at 85% for 2 cycles, 90% for 2 cycles, 95% for 2 cycles, if no increase in overall pain. If that goes as planned, then to the treadmill the visit after that. Assessment: [] Progressing toward goals. Updated her HEP to include stretching of soft tissue for the hip. Added return to run IF she wishes to pursue it in the future. She is please with her progress at this time. [] No change. [] Other:  [x] Patient would continue to benefit from skilled physical therapy services in order to: improve L hip ER ROM, L hip ER, abd, and ext strength so to decrease pain and increase overall function related to ADLs and hobbies. Issued HEP        STG/LTG  STG: (to be met in 6 treatments)  ? Pain:<3/10 in L hip   ? ROM: to 20 deg of hip ext rotation  ? Strength: by by 25% with hip abd, ER and ext  ? Function: able to walk/run for 30 min 2x/wk  Patient to be independent with home exercise program as demonstrated by performance with correct form without cues.      LTG: (to be met in 12 treatments)  Pain

## 2023-11-08 NOTE — CARE COORDINATION
Medicare Cap   [x] Physical Therapy  [] Speech Therapy  [] Occupational therapy  *PT and Speech caps combine      $2230 Limit for PT and Speech combined  $2230 Limit for OT individually  At the beginning of the month where you expect to go over $2150, please add the 1111 [facility] modifier      Patient Name: Jesus Pete  YOB: 1950    Note:  This is an estimate of charges billed.      Date of 705 [facility] Name # units/ charge $$$ charge Daily Total Charge Ongoing Total $$$   5/8/23 IEman 1,1  97.03+20.83 117.86   5/19 Luan,gemma 1,2 28.50+20.83*2 70.16 188.02   5/25 Luan, man 3,1 28.50+22.29*2+20.83 93.91 281.93     5/30 Luan, man 3,1   375.84   6/9 Tex2, man2 28.50+22.29+20.83*2 92.45 93.91    6/12 Tex2, man   71.63 541.37   6/14 Tex3, man 28.50+22.29*3  95.37 636.74   6/21 Tex3, man   95.37 732.11   10/5 IE, man, luan 95.95+21.75+20.32  138.02 870.13   10/10 Tex3, man 28.50+21.75*+20.83  92.83 962.96   10/13 Witry, man2 28.50+20.83*2  70.16 1033.12   10/16 Tex2, man2 28.50+21.75+20.32*2  90.89 1124.01   10/18 Witry, man2 28.50+20.32*2  70.16 1194.17   10/23 Tex2, man2 28.13+21.75+20.32*2    90.52 1284.69   10/25 Tex3, man1   95.37 1380.06   11/30 Tex2, man   71.63 1451.69   11/1 Tex3, man   92.83 1544.52   11/8 luan 28.13+21.75*2  71.63 1616.15

## 2023-11-13 ENCOUNTER — APPOINTMENT (OUTPATIENT)
Dept: PHYSICAL THERAPY | Facility: CLINIC | Age: 73
End: 2023-11-13
Payer: MEDICARE